# Patient Record
Sex: MALE | Race: BLACK OR AFRICAN AMERICAN | NOT HISPANIC OR LATINO | URBAN - METROPOLITAN AREA
[De-identification: names, ages, dates, MRNs, and addresses within clinical notes are randomized per-mention and may not be internally consistent; named-entity substitution may affect disease eponyms.]

---

## 2018-09-20 ENCOUNTER — EMERGENCY (EMERGENCY)
Facility: HOSPITAL | Age: 68
LOS: 0 days | Discharge: HOME | End: 2018-09-20
Attending: EMERGENCY MEDICINE | Admitting: EMERGENCY MEDICINE

## 2018-09-20 VITALS
WEIGHT: 199.3 LBS | HEIGHT: 71 IN | RESPIRATION RATE: 18 BRPM | HEART RATE: 67 BPM | SYSTOLIC BLOOD PRESSURE: 163 MMHG | OXYGEN SATURATION: 100 % | DIASTOLIC BLOOD PRESSURE: 94 MMHG

## 2018-09-20 VITALS
SYSTOLIC BLOOD PRESSURE: 197 MMHG | HEART RATE: 61 BPM | OXYGEN SATURATION: 99 % | DIASTOLIC BLOOD PRESSURE: 106 MMHG | RESPIRATION RATE: 19 BRPM

## 2018-09-20 LAB
ALBUMIN SERPL ELPH-MCNC: 4.5 G/DL — SIGNIFICANT CHANGE UP (ref 3.5–5.2)
ALP SERPL-CCNC: 67 U/L — SIGNIFICANT CHANGE UP (ref 30–115)
ALT FLD-CCNC: 14 U/L — SIGNIFICANT CHANGE UP (ref 0–41)
ANION GAP SERPL CALC-SCNC: 13 MMOL/L — SIGNIFICANT CHANGE UP (ref 7–14)
AST SERPL-CCNC: 32 U/L — SIGNIFICANT CHANGE UP (ref 0–41)
BILIRUB SERPL-MCNC: 0.4 MG/DL — SIGNIFICANT CHANGE UP (ref 0.2–1.2)
BUN SERPL-MCNC: 14 MG/DL — SIGNIFICANT CHANGE UP (ref 10–20)
CALCIUM SERPL-MCNC: 9.2 MG/DL — SIGNIFICANT CHANGE UP (ref 8.5–10.1)
CHLORIDE SERPL-SCNC: 95 MMOL/L — LOW (ref 98–110)
CO2 SERPL-SCNC: 27 MMOL/L — SIGNIFICANT CHANGE UP (ref 17–32)
CREAT SERPL-MCNC: 1.1 MG/DL — SIGNIFICANT CHANGE UP (ref 0.7–1.5)
GLUCOSE SERPL-MCNC: 255 MG/DL — HIGH (ref 70–99)
POTASSIUM SERPL-MCNC: 4.5 MMOL/L — SIGNIFICANT CHANGE UP (ref 3.5–5)
POTASSIUM SERPL-SCNC: 4.5 MMOL/L — SIGNIFICANT CHANGE UP (ref 3.5–5)
PROT SERPL-MCNC: 7.5 G/DL — SIGNIFICANT CHANGE UP (ref 6–8)
SODIUM SERPL-SCNC: 135 MMOL/L — SIGNIFICANT CHANGE UP (ref 135–146)

## 2018-09-20 RX ORDER — MOXIFLOXACIN HCL 0.5 %
1 DROPS OPHTHALMIC (EYE)
Qty: 1 | Refills: 0
Start: 2018-09-20 | End: 2018-09-26

## 2018-09-20 RX ORDER — TETANUS TOXOID, REDUCED DIPHTHERIA TOXOID AND ACELLULAR PERTUSSIS VACCINE, ADSORBED 5; 2.5; 8; 8; 2.5 [IU]/.5ML; [IU]/.5ML; UG/.5ML; UG/.5ML; UG/.5ML
0.5 SUSPENSION INTRAMUSCULAR ONCE
Refills: 0 | Status: COMPLETED | OUTPATIENT
Start: 2018-09-20 | End: 2018-09-20

## 2018-09-20 RX ADMIN — TETANUS TOXOID, REDUCED DIPHTHERIA TOXOID AND ACELLULAR PERTUSSIS VACCINE, ADSORBED 0.5 MILLILITER(S): 5; 2.5; 8; 8; 2.5 SUSPENSION INTRAMUSCULAR at 13:44

## 2018-09-20 NOTE — ED PROVIDER NOTE - MEDICAL DECISION MAKING DETAILS
JSILBERSTEIN: 67M preseneted to [facility] S with traumatic injury to left eye and left supraorbital facial laceration transferred to [facility] of ophthalmologic evaluation. Seen at ophthalmology clinic. Underwent dilated exam and exam of retina. Demonstrated a corneal abrasion and was recommend to start Vigamox. Laceration was repaired in ED. Pt counselled on importance of follow up in clinic tomorrow. Patient was given strict return and follow up precautions. The patient has been informed of all concerning signs and symptoms to return to Emergency Department, the necessity to follow up with PMD/Clinic/follow up provided tomorrow. was explained, and the patient reports understanding of above with capacity and insight.

## 2018-09-20 NOTE — CONSULT NOTE ADULT - ASSESSMENT
1) Multiple corneal abrasions OD s/p blunt trauma  RTC in 1 day for f/u     2) Iris Sphincter Tear OD    3) Traumatic Iritis OD  - START prednisolone acetate 1% oph suspension q6H OD 1) Multiple corneal abrasions OD s/p blunt trauma  - eye flushed in office, instilled loading dose of vigamox and Air Optix N&D bandage contact lens  - START vigamox oph sol q6H OD x 1 week  - Pt educated, no water in or around the eye  - RTC in 1 day for follow up (can be done as outpatient)    2) Iris Sphincter Tear OD  - Instilled 1 gtt atropine in office  - RTC in 1 day for re-evaluation    3) Traumatic Iritis OD  - Pt cyclopleged in office   - RTC in 1 day for f/u; if corneal abrasion healed will consider topical steroid    4) Commotio Retinae and Truchas's Edema OD  - Pt seen by retinal specialiat Dr. Olivera today  - no evidence of globe rupture clinically or with Bscan  - no evidence or retinal detachment  - monitor

## 2018-09-20 NOTE — ED PROVIDER NOTE - PHYSICAL EXAMINATION
CONSTITUTIONAL: Well-appearing; well-nourished; in no apparent distress.   EYES: Right eye: + fixed and dilated pupil. Conjunctiva clear. EOM intact. On fluorescin: multiple foreign bodies on cornea.   ENT: normal nose; no rhinorrhea; normal pharynx with no tonsillar hypertrophy.   CARDIOVASCULAR: Normal S1, S2; no murmurs, rubs, or gallops.   RESPIRATORY: Normal chest excursion with respiration; breath sounds clear and equal bilaterally; no wheezes, rhonchi, or rales.  MS: No evidence of trauma or deformity.  Normal ROM in all four extremities; non-tender to palpation  SKIN: + right sided periorbital swelling. + 3cm laceration over inferior orbit. + multiple small foreign bodies seen and removed  NEURO/PSYCH: A & O x 4; grossly unremarkable. mood and manner are appropriate. No focal deficits.

## 2018-09-20 NOTE — ED PROVIDER NOTE - PROGRESS NOTE DETAILS
ambulance called 1 hour ago, not yet here, admin notified pt received as transfer from [facility]. spoke to [facility] dr. arriaga, says to send the patient up to [facility] JANETH: Pt received from [facility] pending opthalmology consult. 67M pmhx of htn, dm, co 7/10 right eye pain and visual acuity loss sp pressure gauge hitting him in eye. Pt was wearing corrective lenses when gauge hit and pts glasses broke. Pt currently HD stable. No proptosis. Ophthalmology aware and pt transferred to clin for eval. pt reevaluated. dc plan, return precautions and need for f/u discussed JANETH: Attending Statement: I have personally performed a face to face diagnostic evaluation on this patient. I have reviewed the Resident note and agree with the history, exam and plan of care, except as noted. (see mdm)

## 2018-09-20 NOTE — ED PROVIDER NOTE - ATTENDING CONTRIBUTION TO CARE
pt with eye trauma as above, on exam has fixed dilated cloudy pupil on right, EOMI, no hyphema, +corneal FBs neg Hebert's, also with infero orbital laceration, d/w optometrist, will transfer for mian d/w Dr Escalante, pt to go to [facility], CT orbits read pending

## 2018-09-20 NOTE — ED PROVIDER NOTE - NS ED ROS FT
Constitutional: no fever, chills, no recent weight loss, change in appetite or malaise  ENT: + right eye pain and swelling  Cardiac: No chest pain, SOB or edema.  Respiratory: No cough or respiratory distress  GI: No nausea, vomiting, diarrhea or abdominal pain.  Neuro: No headache or weakness. No LOC.  Skin: + lac to inferior orbit  Except as documented in the HPI, all other systems are negative.

## 2018-09-20 NOTE — ED ADULT NURSE REASSESSMENT NOTE - NS ED NURSE REASSESS COMMENT FT1
Received pt from [facility]. Vitals taken, pt in no distress, sent to [facility] clinic.
pt being transferred to [facility] ED, report given to RD Galvan.

## 2018-09-20 NOTE — ED ADULT TRIAGE NOTE - CHIEF COMPLAINT QUOTE
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

## 2018-09-20 NOTE — CONSULT NOTE ADULT - SUBJECTIVE AND OBJECTIVE BOX
FRANK BOWENS  MRN-[id number]  67y Male      Patient is a 67y old  Male who presents with a chief complaint of   HEALTH ISSUES - R/O PROBLEM Dx:    HPI: Pt reports being hit with pressurized air hose from a tire when working today. Pt reports it hit him directly in the face, knocking off his glasses. Pt reports being unable to open his eye since then. Pt denies LOC, reports no flashes or floaters,       Extended HPI:  (-)burning, itching, redness, tearing OD/OS  (-)flashes, floaters, eye pain OD/OS    PAST MEDICAL & SURGICAL HISTORY:  HTN  Type 2 DM  Hernia repair  Groin repair    MEDICATIONS  (STANDING):  metformin  nefedipine  metroprolol  insulin      MEDICATIONS  (PRN):    Allergies    No Known Allergies    Intolerances        POHx:  JULISSA:   (-)injuries/surgeries    Ocular Medications: none    FOHx: Non-contributory    VISUAL ACUITY  OD: 20/ sc/cc PH: 20/  OS 20/ sc/cc PH: 20/    NEURO TESTING  Pupils: 	PERRL, (-) APD OD/OS  EOMS: 	FULL OD/OS  CVF: 	Full to red light OD/OS    ANTERIOR SEGMENT EVALUATION:   lids/lashes: 	clear OD/OS  conjunctiva: 	clear OD/OS  cornea: 	clear OD/OS  anterior chamber: deep & quiet OD/OS  iris: 	flat and even OD/OS    INTRAOCULAR PRESSURE  Goldmann Applanation Tonometry/Tonopen  OD: mmHg  OS: mmHg  @    POSTERIOR SEGMENT EVALUATION  Dilated: Tropicamide 1%, Phenylephrine 2.5% OD/OS  Lens: 		clear OD/OS  Vitreous: 	clear OD/OS  Optic nerve:	distinct, pink and healthy OD/OS  Macula: 	flat, even OD/OS  Vessels: 	2:3 OD/OS  Periphery: 	flat, (-)holes/breaks/tears 360 OD/OS    SUPPLEMENTARY TESTING: FRANK BOWENS  MRN-[id number]  67y Male      Patient is a 67y old  Male who presents with a chief complaint of right eye trauma     HEALTH ISSUES - R/O PROBLEM Dx:    HPI: 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 Pt report the hose it hit him directly in the face, knocking off his glasses and cutting the area around his face. Pt reports being unable to open his eye since then, c/o eye pain, redness and swelling. Pt denies LOC, reports no flashes or floaters, no curtain over the eye. Pt denies double vision but c/o light sensitivity/   (-)burning, itching, OD/OS  (-)flashes, floaters OD/OS    PAST MEDICAL & SURGICAL HISTORY:  HTN  Type 2 DM  Hernia repair  Groin repair    MEDICATIONS  (STANDING):  metformin  nefedipine  metroprolol  lantus  losartan    Allergies  No Known Allergies    Intolerances  None    POHx:  JULISSA: 2 months  Refractive Error OU  (-)injuries/surgeries    Ocular Medications: none    FOHx: Non-contributory    VISUAL ACUITY  OD: sc20/100  PH: 20/60  OS  sc20/80 PH: 20/60+1    NEURO TESTING  Pupils: 	OD: fixed, dilated, OS: RRL, (-)APD  EOMS: 	FULL OD/OS  CVF: 	Full to red light OD/OS    ANTERIOR SEGMENT EVALUATION:   lids/lashes: 	OD: 1+ superior edema, erythema, hemorrhagic discharge, OS: cl  conjunctiva: 	OD: inferior temporal conjunctival edema, TR[place]ON superior and inferior; OS: clear  cornea: 	            OD: 3 distinct epithelial defects inferior/central/nasal, (-)omar; OS: clear  anterior chamber: OD: 3+cell, 1+flare, 2+red cells; OS: deep & quiet  iris: 	            OD: sphincter tear, hemorrhagic material nasal, (-)NVI; OS: flat, even, (-)NVI    INTRAOCULAR PRESSURE  Goldmann Applanation Tonometry  OD: 20mmHg  OS: 15mmHg  @4:00pm    POSTERIOR SEGMENT EVALUATION  **hazy view OD 2' media**  Dilated: Tropicamide 1%, Phenylephrine 2.5% OD/OS  Lens: 		2+PCC, 2+ACC, 1+NS OD/OS  Vitreous: 	clear OD/OS  Optic nerve:	distinct, pink and healthy OD/OS  Macula: 	flat, even OD/OS  Vessels: 	2:3 OD/OS  Periphery: 	OD: commotio retinae inferior, temporal, nasal, flat to extent seen; OS: flat, (-)h/b/t 360 FRANK BOWENS  MRN-[id number]  67y Male    Patient is a 67y old  Male who presents with a chief complaint of right eye trauma     HEALTH ISSUES - R/O PROBLEM Dx:    HPI: Pt reports being hit with pressurized air hose from 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 Pt report the hose it hit him directly in the face, knocking off his glasses and cutting the area around his face. Pt reports being unable to open his eye since then, c/o eye pain, redness and swelling. Pt denies LOC, reports no flashes or floaters, no curtain over the eye. Pt denies double vision but c/o light sensitivity.   (-)burning, itching, OD/OS  (-)flashes, floaters OD/OS    PAST MEDICAL & SURGICAL HISTORY:  HTN  Type 2 DM  Hernia repair  Groin repair    MEDICATIONS  (STANDING):  metformin  nefedipine  metroprolol  lantus  losartan    Allergies  No Known Allergies    Intolerances  None    POHx:  JULISSA: 2 months  Refractive Error OU  (-)injuries/surgeries    Ocular Medications: none    FOHx: Non-contributory    VISUAL ACUITY  OD: sc20/100  PH: 20/60  OS  sc20/80 PH: 20/60+1    NEURO TESTING  Pupils: 	OD: fixed, dilated, OS: RRL, (-)APD  EOMS: 	FULL OD/OS  CVF: 	Full to red light OD/OS    ANTERIOR SEGMENT EVALUATION:   lids/lashes: 	OD: 1+ superior edema, erythema, hemorrhagic discharge, OS: cl  conjunctiva: 	OD: inferior temporal conjunctival edema, TR[place]ON superior and inferior; OS: clear  cornea: 	            OD: 3 distinct epithelial defects inferior/central/nasal, (-)omar; OS: clear  anterior chamber: OD: 3+cell, 1+flare, 2+red cells; OS: deep & quiet  iris: 	            OD: sphincter tear, hemorrhagic material nasal, (-)NVI; OS: flat, even, (-)NVI    INTRAOCULAR PRESSURE  Goldmann Applanation Tonometry  OD: 20mmHg  OS: 15mmHg  @4:00pm    POSTERIOR SEGMENT EVALUATION  **hazy view OD 2' media**  Dilated: Tropicamide 1%, Phenylephrine 2.5% OD/OS  Lens: 		2+PCC, 2+ACC, 1+NS OD/OS  Vitreous: 	clear OD/OS  Optic nerve:	distinct, pink and healthy OD/OS  Macula: 	flat, even OD/OS  Vessels: 	2:3 OD/OS  Periphery: 	OD: commotio retinae inferior, temporal, nasal, flat to extent seen; OS: flat, (-)h/b/t 360 FRANK BOWENS  MRN-[id number]  67y Male    Patient is a 67y old  Male who presents with a chief complaint of right eye trauma     HEALTH ISSUES - R/O PROBLEM Dx:    HPI: Pt reports being hit with pressurized air hose from 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 Pt report the hose it hit him directly in the face, knocking off his glasses and cutting the area around his face. Pt reports being unable to open his eye since then, c/o eye pain, redness and swelling. Pt denies LOC, reports no flashes or floaters, no curtain over the eye. Pt denies double vision but c/o light sensitivity.   (-)burning, itching, OD/OS  (-)flashes, floaters OD/OS    PAST MEDICAL & SURGICAL HISTORY:  HTN  Type 2 DM  Hernia repair  Groin repair    MEDICATIONS  (STANDING):  metformin  nefedipine  metroprolol  lantus  losartan    Allergies  No Known Allergies    Intolerances  None    POHx:  JULISSA: 2 months  Refractive Error OU  (-)injuries/surgeries    Ocular Medications: none    FOHx: Non-contributory    VISUAL ACUITY  OD: sc20/100  PH: 20/60  OS  sc20/80 PH: 20/60+1    NEURO TESTING  Pupils: 	OD: fixed, dilated, OS: RRL, (-)APD  EOMS: 	FULL OD/OS  CVF: 	Full to red light OD/OS    ANTERIOR SEGMENT EVALUATION:   lids/lashes: 	OD: 1+ superior edema, erythema, hemorrhagic discharge, OS: cl  conjunctiva: 	OD: inferior temporal conjunctival edema, TR[place]ON superior and inferior; OS: clear  cornea: 	            OD: 3 distinct epithelial defects inferior/central/nasal, (-)omar; OS: clear  anterior chamber: OD: 3+cell, 1+flare, 2+red cells; OS: deep & quiet  iris: 	            OD: sphincter tear, hemorrhagic material nasal, (-)NVI; OS: flat, even, (-)NVI    INTRAOCULAR PRESSURE  Goldmann Applanation Tonometry  OD: 20mmHg  OS: 15mmHg  @4:00pm    POSTERIOR SEGMENT EVALUATION  **hazy view OD 2' media**  Dilated: Tropicamide 1%, Phenylephrine 2.5% OD/OS  Lens: 		2+PCC, 2+ACC, 1+NS OD/OS  Vitreous: 	clear OD/OS  Optic nerve:	distinct, pink and healthy OD/OS  Macula: 	OD: berlin's edema; OS: flat, even  Vessels: 	OD: grossly wnl; OS: 2;3  Periphery: 	OD: commotio retinae inferior, temporal, nasal, flat to extent seen; OS: flat, (-)h/b/t 360      Bscan Ultrasound  OD: no sign of retinal detachment, partial PVD

## 2018-09-20 NOTE — ED PROVIDER NOTE - OBJECTIVE STATEMENT
67 year old male with pmhx of DM/HTN c/o right eye pain after he had a pressure carmina hit his right orbit. + laceration to inferior orbit. No loc. + pain and swelling to right eye. No n/v, tearing.

## 2018-09-20 NOTE — ED PROVIDER NOTE - CARE PLAN
Principal Discharge DX:	Eye pain, right  Secondary Diagnosis:	Foreign body in eye  Secondary Diagnosis:	Laceration of face with foreign body
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
## 2020-04-17 DIAGNOSIS — H57.10 OCULAR PAIN, UNSPECIFIED EYE: ICD-10-CM
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
## 2020-04-17 DIAGNOSIS — E11.9 TYPE 2 DIABETES MELLITUS WITHOUT COMPLICATIONS: ICD-10-CM

## 2020-04-17 DIAGNOSIS — I10 ESSENTIAL (PRIMARY) HYPERTENSION: ICD-10-CM

## 2020-04-17 DIAGNOSIS — S05.41XA PENETRATING WOUND OF ORBIT WITH OR WITHOUT FOREIGN BODY, RIGHT EYE, INITIAL ENCOUNTER: ICD-10-CM
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
## 2020-04-17 DIAGNOSIS — Z23 ENCOUNTER FOR IMMUNIZATION: ICD-10-CM
